# Patient Record
Sex: FEMALE | Race: WHITE | NOT HISPANIC OR LATINO | URBAN - METROPOLITAN AREA
[De-identification: names, ages, dates, MRNs, and addresses within clinical notes are randomized per-mention and may not be internally consistent; named-entity substitution may affect disease eponyms.]

---

## 2020-07-08 ENCOUNTER — OFFICE VISIT (OUTPATIENT)
Dept: URBAN - METROPOLITAN AREA CLINIC 113 | Facility: CLINIC | Age: 45
End: 2020-07-08

## 2020-07-25 ENCOUNTER — TELEPHONE ENCOUNTER (OUTPATIENT)
Dept: URBAN - METROPOLITAN AREA CLINIC 13 | Facility: CLINIC | Age: 45
End: 2020-07-25

## 2020-07-25 RX ORDER — CIPROFLOXACIN HYDROCHLORIDE 500 MG/1
TAKE 1 TABLET TWICE DAILY TABLET, FILM COATED ORAL
Qty: 28 | Refills: 0 | OUTPATIENT
Start: 2016-06-16 | End: 2018-09-26

## 2020-07-25 RX ORDER — HYDROCORTISONE ACETATE 25 MG
INSERT 1 SUPPOSITORY RECTALLY EVERY NIGHT AT BEDTIME AS NEEDED SUPPOSITORY, RECTAL RECTAL
Qty: 7 | Refills: 0 | OUTPATIENT
Start: 2015-07-26 | End: 2018-09-26

## 2020-07-25 RX ORDER — TRAMADOL HYDROCHLORIDE 50 MG/1
TAKE 1 TABLET EVERY 4 TO 6 HOURS AS NEEDED TABLET ORAL
Qty: 25 | Refills: 0 | OUTPATIENT
Start: 2015-07-27 | End: 2016-06-16

## 2020-07-25 RX ORDER — CHOLESTYRAMINE 4 G/9G
USE 1 SCOOP TWICE DAILY POWDER, FOR SUSPENSION ORAL
Qty: 378 | Refills: 10 | OUTPATIENT
Start: 2015-05-29 | End: 2016-06-16

## 2020-07-25 RX ORDER — PREDNISONE 10 MG/1
TAKE 6 TABLET DAILY DECREASE BY 5 MG EACH WEEK TIL GONE TABLET ORAL
Qty: 150 | Refills: 0 | OUTPATIENT
Start: 2015-07-28 | End: 2016-06-16

## 2020-07-25 RX ORDER — HYDROCORTISONE ACETATE 25 MG/1
INSERT 1 SUPPOSITORY RECTALLY AT BEDTIME AS NEEDED SUPPOSITORY RECTAL
Qty: 7 | Refills: 0 | OUTPATIENT
Start: 2015-05-29 | End: 2015-06-25

## 2020-07-25 RX ORDER — CALCIUM CARBONATE 1000 MG/1
TAKE 4 TABLET DAILY TABLET, CHEWABLE ORAL
Refills: 0 | OUTPATIENT
End: 2015-07-27

## 2020-07-25 RX ORDER — CIPROFLOXACIN HYDROCHLORIDE 500 MG/1
TAKE 1 TABLET TWICE DAILY TABLET, FILM COATED ORAL
Qty: 28 | Refills: 0 | OUTPATIENT
Start: 2015-07-27 | End: 2015-08-19

## 2020-07-25 RX ORDER — CHLORDIAZEPOXIDE HYDROCHLORIDE AND CLIDINIUM BROMIDE 5; 2.5 MG/1; MG/1
TAKE 1 CAPSULE EVERY 6 HOURS PRN CAPSULE ORAL
Qty: 50 | Refills: 1 | OUTPATIENT
Start: 2015-08-31 | End: 2016-06-16

## 2020-07-25 RX ORDER — PANTOPRAZOLE SODIUM 20 MG
TAKE 1 TABLET DAILY(PT STATES DOSAGE UNKNOWN) TABLET, DELAYED RELEASE (ENTERIC COATED) ORAL
Refills: 0 | OUTPATIENT
End: 2015-06-25

## 2020-07-25 RX ORDER — METRONIDAZOLE 500 MG/1
TAKE 1 TABLET 3 TIMES DAILY UNTIL GONE TABLET ORAL
Qty: 42 | Refills: 0 | OUTPATIENT
Start: 2016-06-16 | End: 2018-09-26

## 2020-07-25 RX ORDER — OXYCODONE HYDROCHLORIDE AND ACETAMINOPHEN 5; 325 MG/1; MG/1
TAKE 1 TO 2 TABLETS EVERY 4 HOURS AS NEEDED FOR PAIN TABLET ORAL
Qty: 30 | Refills: 0 | OUTPATIENT
End: 2015-06-25

## 2020-07-25 RX ORDER — METRONIDAZOLE 500 MG/1
TAKE 1 TABLET 3 TIMES DAILY TABLET ORAL
Qty: 42 | Refills: 0 | OUTPATIENT
Start: 2015-07-27 | End: 2015-08-19

## 2020-07-25 RX ORDER — GLUC/MSM/COLGN2/HYAL/ANTIARTH3 375-375-20
TAKE 1 TABLET DAILY AS DIRECTED TABLET ORAL
Refills: 0 | OUTPATIENT
End: 2015-07-27

## 2020-07-25 RX ORDER — POLYETHYLENE GLYCOL 3350, SODIUM SULFATE, SODIUM CHLORIDE, POTASSIUM CHLORIDE, ASCORBIC ACID, SODIUM ASCORBATE 7.5-2.691G
USE AS DIRECTED KIT ORAL
Qty: 1 | Refills: 0 | OUTPATIENT
Start: 2015-07-28 | End: 2015-08-11

## 2020-07-25 RX ORDER — HYDROCORTISONE ACETATE 25 MG/1
INSERT 1 SUPPOSITORY RECTALLY 2 TIMES DAILY SUPPOSITORY RECTAL
Qty: 14 | Refills: 0 | OUTPATIENT
Start: 2015-07-24 | End: 2015-07-27

## 2020-07-25 RX ORDER — FLUCONAZOLE 100 MG/1
TAKE 1 TABLET DAILY TABLET ORAL
Qty: 5 | Refills: 0 | OUTPATIENT
Start: 2015-07-31 | End: 2015-08-19

## 2020-07-25 RX ORDER — HYDROCORTISONE ACETATE 25 MG/1
INSERT 1 SUPPOSITORY RECTALLY AT BEDTIME AS NEEDED SUPPOSITORY RECTAL
Qty: 7 | Refills: 0 | OUTPATIENT
Start: 2016-06-13 | End: 2016-06-16

## 2020-07-25 RX ORDER — POLYETHYLENE GLYCOL 3350, SODIUM CHLORIDE, SODIUM BICARBONATE AND POTASSIUM CHLORIDE WITH LEMON FLAVOR 420; 11.2; 5.72; 1.48 G/4L; G/4L; G/4L; G/4L
TAKE 1/2 GALLON AT 5:00 PM DAY BEFORE PROCEDURE, TAKE SECOND 1/2 OF GALLON 6 HRS PRIOR TO PROCEDURE POWDER, FOR SOLUTION ORAL
Qty: 1 | Refills: 0 | OUTPATIENT
Start: 2018-12-05 | End: 2018-12-10

## 2020-07-25 RX ORDER — CERTOLIZUMAB PEGOL 400 MG
INJECT MG  400 MG EVERY 2 WEEKS KIT SUBCUTANEOUS
Refills: 0 | OUTPATIENT
End: 2018-09-26

## 2020-07-25 RX ORDER — CONJUGATED ESTROGENS 0.62 MG/G
INSERT 1 APPLICATOR DAILY CREAM VAGINAL
Refills: 0 | OUTPATIENT
End: 2018-09-26

## 2020-07-25 RX ORDER — PANTOPRAZOLE SODIUM 40 MG/1
TAKE ONE TABLET BY MOUTH TWICE A DAY TABLET, DELAYED RELEASE ORAL
Qty: 60 | Refills: 0 | OUTPATIENT
Start: 2015-09-29 | End: 2016-06-16

## 2020-07-26 ENCOUNTER — TELEPHONE ENCOUNTER (OUTPATIENT)
Dept: URBAN - METROPOLITAN AREA CLINIC 13 | Facility: CLINIC | Age: 45
End: 2020-07-26

## 2020-07-26 RX ORDER — USTEKINUMAB 90 MG/ML
MAINTENANCE: INJECT 1 SYRINGE SUBCUTANEOUSLY EVERY 4 WEEKS. REFRIGERATE. DO NOT FREEZE INJECTION, SOLUTION SUBCUTANEOUS
Qty: 3 | Refills: 3 | Status: ACTIVE | COMMUNITY
Start: 2020-07-21

## 2020-07-26 RX ORDER — LORAZEPAM 0.5 MG/1
TAKE 1 TABLET EVERY 4-6 HOURS PRN FOR RESTLESSNESS TABLET ORAL
Qty: 30 | Refills: 0 | Status: ACTIVE | COMMUNITY

## 2020-07-26 RX ORDER — METHOTREXATE 25 MG/ML
INJECT 25 MG(1ML) SUBCUTANEOUS ONCE A WEEK INJECTION, SOLUTION INTRA-ARTERIAL; INTRAMUSCULAR; INTRAVENOUS
Qty: 12 | Refills: 3 | Status: ACTIVE | COMMUNITY
Start: 2016-07-01

## 2020-07-26 RX ORDER — AMLODIPINE BESYLATE 2.5 MG/1
TAKE 1 TABLET DAILY TABLET ORAL
Refills: 0 | Status: ACTIVE | COMMUNITY
Start: 2019-08-07

## 2020-07-26 RX ORDER — AZITHROMYCIN DIHYDRATE 500 MG/1
TABLET, FILM COATED ORAL
Qty: 5 | Refills: 0 | Status: ACTIVE | COMMUNITY
Start: 2018-03-19

## 2020-07-26 RX ORDER — AMOXICILLIN AND CLAVULANATE POTASSIUM 875; 125 MG/1; MG/1
TABLET, FILM COATED ORAL
Qty: 14 | Refills: 0 | Status: ACTIVE | COMMUNITY
Start: 2019-08-08

## 2020-07-26 RX ORDER — FLUCONAZOLE 150 MG/1
TABLET ORAL
Qty: 1 | Refills: 0 | Status: ACTIVE | COMMUNITY
Start: 2018-11-12

## 2020-07-26 RX ORDER — ONDANSETRON 8 MG/1
TABLET ORAL
Qty: 18 | Refills: 0 | Status: ACTIVE | COMMUNITY
Start: 2018-03-10

## 2020-07-26 RX ORDER — BUPROPION HYDROCHLORIDE 300 MG/1
TK 1 T PO  QAM TABLET, EXTENDED RELEASE ORAL
Qty: 90 | Refills: 0 | Status: ACTIVE | COMMUNITY
Start: 2018-10-01

## 2020-07-26 RX ORDER — FOLIC ACID 1 MG/1
TAKE 1 TABLET BY MOUTH DAILY AS DIRECTED TABLET ORAL
Qty: 90 | Refills: 3 | Status: ACTIVE | COMMUNITY
Start: 2019-04-10

## 2020-07-26 RX ORDER — CHOLESTYRAMINE 4 G/5.5G
MIX THE CONTENTS OF 1 POWDER PACKET WITH 2-6 OZ OF NONCARBONATED BEVERAGE AND SWALLOW ONCE DAILY IN THE EVENING POWDER, FOR SUSPENSION ORAL
Qty: 60 | Refills: 5 | Status: ACTIVE | COMMUNITY
Start: 2019-11-07

## 2020-07-26 RX ORDER — NYSTATIN 100000 [USP'U]/ML
SUSPENSION ORAL
Qty: 140 | Refills: 0 | Status: ACTIVE | COMMUNITY
Start: 2018-03-19

## 2020-07-26 RX ORDER — PHENTERMINE HYDROCHLORIDE 30 MG/1
TK 1 T PO  QD TABLET ORAL
Qty: 30 | Refills: 0 | Status: ACTIVE | COMMUNITY
Start: 2019-03-07

## 2020-07-26 RX ORDER — HYDROCODONE BITARTRATE AND ACETAMINOPHEN 5; 325 MG/1; MG/1
TABLET ORAL
Qty: 20 | Refills: 0 | Status: ACTIVE | COMMUNITY
Start: 2019-03-19

## 2020-07-26 RX ORDER — ALENDRONATE SODIUM 70 MG/1
TABLET ORAL
Qty: 4 | Refills: 0 | Status: ACTIVE | COMMUNITY
Start: 2018-03-05

## 2020-07-26 RX ORDER — FLUCONAZOLE 150 MG/1
TABLET ORAL
Qty: 1 | Refills: 0 | Status: ACTIVE | COMMUNITY
Start: 2018-03-19

## 2020-07-26 RX ORDER — AMOXICILLIN AND CLAVULANATE POTASSIUM 875; 125 MG/1; MG/1
TABLET, FILM COATED ORAL
Qty: 14 | Refills: 0 | Status: ACTIVE | COMMUNITY
Start: 2019-02-19

## 2020-07-26 RX ORDER — ONDANSETRON 8 MG/1
TK 1 T PO Q 8 H PRF NAUSEA TABLET ORAL
Qty: 18 | Refills: 0 | Status: ACTIVE | COMMUNITY
Start: 2018-03-05

## 2020-07-26 RX ORDER — OSELTAMIVIR PHOSPHATE 75 MG/1
CAPSULE ORAL
Qty: 10 | Refills: 0 | Status: ACTIVE | COMMUNITY
Start: 2019-01-31

## 2020-07-26 RX ORDER — ONDANSETRON HYDROCHLORIDE 4 MG/1
TABLET, FILM COATED ORAL
Qty: 9 | Refills: 0 | Status: ACTIVE | COMMUNITY
Start: 2018-01-09

## 2020-07-26 RX ORDER — ONDANSETRON HYDROCHLORIDE 4 MG/1
TK 1 T PO Q 8 H PRF NAUSEA TABLET, FILM COATED ORAL
Qty: 9 | Refills: 0 | Status: ACTIVE | COMMUNITY
Start: 2017-08-09

## 2020-07-26 RX ORDER — AMOXICILLIN AND CLAVULANATE POTASSIUM 875; 125 MG/1; MG/1
TABLET, FILM COATED ORAL
Qty: 14 | Refills: 0 | Status: ACTIVE | COMMUNITY
Start: 2018-11-01

## 2020-07-26 RX ORDER — ESTROGENS, CONJUGATED 0.62 MG/1
TAKE 1 TABLET DAILY TABLET, FILM COATED ORAL
Refills: 0 | Status: ACTIVE | COMMUNITY

## 2020-07-26 RX ORDER — NYSTATIN 100000 [USP'U]/ML
SWISH AND SWALLOW THREE TIMES DAILY AS NEEDED FOR THRUSH SUSPENSION ORAL
Qty: 1800 | Refills: 0 | Status: ACTIVE | COMMUNITY
Start: 2019-01-18

## 2020-07-26 RX ORDER — ONDANSETRON 8 MG/1
TABLET ORAL
Qty: 18 | Refills: 0 | Status: ACTIVE | COMMUNITY
Start: 2019-01-14

## 2020-07-26 RX ORDER — BUDESONIDE 28 MG/1
APPLY RECTALLY TWICE DAILY AEROSOL, FOAM RECTAL
Qty: 2 | Refills: 2 | Status: ACTIVE | COMMUNITY
Start: 2019-11-07

## 2020-07-26 RX ORDER — BENAZEPRIL HYDROCHLORIDE 10 MG/1
TABLET, COATED ORAL
Qty: 30 | Refills: 0 | Status: ACTIVE | COMMUNITY
Start: 2019-02-19

## 2021-02-24 ENCOUNTER — TELEPHONE ENCOUNTER (OUTPATIENT)
Dept: URBAN - METROPOLITAN AREA CLINIC 113 | Facility: CLINIC | Age: 46
End: 2021-02-24

## 2021-07-23 ENCOUNTER — ERX REFILL RESPONSE (OUTPATIENT)
Dept: URBAN - METROPOLITAN AREA CLINIC 113 | Facility: CLINIC | Age: 46
End: 2021-07-23

## 2021-07-23 ENCOUNTER — TELEPHONE ENCOUNTER (OUTPATIENT)
Dept: URBAN - METROPOLITAN AREA CLINIC 113 | Facility: CLINIC | Age: 46
End: 2021-07-23

## 2021-07-23 RX ORDER — USTEKINUMAB 90 MG/ML
MAINTENANCE: INJECT 1 SYRINGE SUBCUTANEOUSLY EVERY 4 WEEKS. REFRIGERATE. DO NOT FREEZE INJECTION, SOLUTION SUBCUTANEOUS
Qty: 3 | Refills: 4
Start: 2020-07-21 | End: 2022-10-16

## 2021-07-23 RX ORDER — USTEKINUMAB 90 MG/ML
MAINTENANCE: INJECT 1 SYRINGE SUBCUTANEOUSLY EVERY 4 WEEKS. REFRIGERATE. DO NOT FREEZE INJECTION, SOLUTION SUBCUTANEOUS
Qty: 1 | Refills: 3 | OUTPATIENT

## 2021-07-23 RX ORDER — NYSTATIN 100000 [USP'U]/ML
4 ML SUSPENSION ORAL
Qty: 1080 ML | Refills: 4
Start: 2018-03-19 | End: 2022-10-16

## 2021-07-23 RX ORDER — USTEKINUMAB 90 MG/ML
MAINTENANCE: INJECT 1 SYRINGE SUBCUTANEOUSLY EVERY 4 WEEKS. REFRIGERATE. DO NOT FREEZE INJECTION, SOLUTION SUBCUTANEOUS
Qty: 3 | Refills: 4 | OUTPATIENT

## 2021-08-02 ENCOUNTER — TELEPHONE ENCOUNTER (OUTPATIENT)
Dept: URBAN - METROPOLITAN AREA CLINIC 113 | Facility: CLINIC | Age: 46
End: 2021-08-02

## 2021-08-02 RX ORDER — USTEKINUMAB 90 MG/ML
90 MG/ML INJECTION, SOLUTION SUBCUTANEOUS
Qty: 3 | Refills: 6

## 2022-08-29 ENCOUNTER — TELEPHONE ENCOUNTER (OUTPATIENT)
Dept: URBAN - METROPOLITAN AREA CLINIC 113 | Facility: CLINIC | Age: 47
End: 2022-08-29

## 2022-08-29 RX ORDER — USTEKINUMAB 90 MG/ML
90 MG/ML INJECTION, SOLUTION SUBCUTANEOUS
Qty: 3 | Refills: 6
End: 2024-05-20

## 2022-08-29 RX ORDER — ONDANSETRON HYDROCHLORIDE 4 MG/1
1 TABLET TABLET, FILM COATED ORAL EVERY 4 HOURS
Qty: 30 TABLET | Refills: 1
Start: 2017-08-09

## 2022-09-08 ENCOUNTER — TELEPHONE ENCOUNTER (OUTPATIENT)
Dept: URBAN - METROPOLITAN AREA CLINIC 113 | Facility: CLINIC | Age: 47
End: 2022-09-08

## 2022-11-09 ENCOUNTER — OFFICE VISIT (OUTPATIENT)
Dept: URBAN - METROPOLITAN AREA CLINIC 113 | Facility: CLINIC | Age: 47
End: 2022-11-09

## 2023-01-05 ENCOUNTER — WEB ENCOUNTER (OUTPATIENT)
Dept: URBAN - METROPOLITAN AREA CLINIC 113 | Facility: CLINIC | Age: 48
End: 2023-01-05

## 2023-01-05 ENCOUNTER — LAB OUTSIDE AN ENCOUNTER (OUTPATIENT)
Dept: URBAN - METROPOLITAN AREA CLINIC 113 | Facility: CLINIC | Age: 48
End: 2023-01-05

## 2023-01-05 ENCOUNTER — DASHBOARD ENCOUNTERS (OUTPATIENT)
Age: 48
End: 2023-01-05

## 2023-01-05 ENCOUNTER — OFFICE VISIT (OUTPATIENT)
Dept: URBAN - METROPOLITAN AREA CLINIC 113 | Facility: CLINIC | Age: 48
End: 2023-01-05
Payer: COMMERCIAL

## 2023-01-05 VITALS
TEMPERATURE: 97.5 F | DIASTOLIC BLOOD PRESSURE: 85 MMHG | RESPIRATION RATE: 18 BRPM | HEART RATE: 80 BPM | SYSTOLIC BLOOD PRESSURE: 128 MMHG | BODY MASS INDEX: 24.24 KG/M2 | HEIGHT: 64 IN | WEIGHT: 142 LBS

## 2023-01-05 DIAGNOSIS — K50.80 CROHN'S DISEASE OF BOTH SMALL AND LARGE INTESTINE WITHOUT COMPLICATION: ICD-10-CM

## 2023-01-05 PROBLEM — 71833008: Status: ACTIVE | Noted: 2021-03-01

## 2023-01-05 PROCEDURE — 99214 OFFICE O/P EST MOD 30 MIN: CPT | Performed by: INTERNAL MEDICINE

## 2023-01-05 RX ORDER — CHOLESTYRAMINE 4 G/5.5G
MIX THE CONTENTS OF 1 POWDER PACKET WITH 2-6 OZ OF NONCARBONATED BEVERAGE AND SWALLOW ONCE DAILY IN THE EVENING POWDER, FOR SUSPENSION ORAL
Qty: 60 | Refills: 5 | Status: ON HOLD | COMMUNITY
Start: 2019-11-07

## 2023-01-05 RX ORDER — AZITHROMYCIN DIHYDRATE 500 MG/1
TABLET, FILM COATED ORAL
Qty: 5 | Refills: 0 | Status: ON HOLD | COMMUNITY
Start: 2018-03-19

## 2023-01-05 RX ORDER — CARVEDILOL 6.25 MG/1
1 TABLET WITH FOOD TABLET, FILM COATED ORAL TWICE A DAY
Status: ACTIVE | COMMUNITY

## 2023-01-05 RX ORDER — OSELTAMIVIR PHOSPHATE 75 MG/1
CAPSULE ORAL
Qty: 10 | Refills: 0 | Status: ON HOLD | COMMUNITY
Start: 2019-01-31

## 2023-01-05 RX ORDER — BENAZEPRIL HYDROCHLORIDE 10 MG/1
TABLET, COATED ORAL
Qty: 30 | Refills: 0 | Status: ON HOLD | COMMUNITY
Start: 2019-02-19

## 2023-01-05 RX ORDER — USTEKINUMAB 90 MG/ML
90 MG/ML INJECTION, SOLUTION SUBCUTANEOUS
Qty: 3 | Refills: 6 | Status: ACTIVE | COMMUNITY
End: 2024-05-20

## 2023-01-05 RX ORDER — AMLODIPINE BESYLATE 2.5 MG/1
TAKE 1 TABLET DAILY TABLET ORAL
Refills: 0 | Status: ACTIVE | COMMUNITY
Start: 2019-08-07

## 2023-01-05 RX ORDER — FLUCONAZOLE 150 MG/1
TABLET ORAL
Qty: 1 | Refills: 0 | Status: ON HOLD | COMMUNITY
Start: 2018-03-19

## 2023-01-05 RX ORDER — ALENDRONATE SODIUM 70 MG/1
TABLET ORAL
Qty: 4 | Refills: 0 | Status: ON HOLD | COMMUNITY
Start: 2018-03-05

## 2023-01-05 RX ORDER — FOLIC ACID 1 MG/1
TAKE 1 TABLET BY MOUTH DAILY AS DIRECTED TABLET ORAL
Qty: 90 | Refills: 3 | Status: ON HOLD | COMMUNITY
Start: 2019-04-10

## 2023-01-05 RX ORDER — ONDANSETRON HYDROCHLORIDE 4 MG/1
1 TABLET TABLET, FILM COATED ORAL EVERY 4 HOURS
Qty: 30 TABLET | Refills: 1 | Status: ACTIVE | COMMUNITY
Start: 2017-08-09

## 2023-01-05 RX ORDER — ONDANSETRON 8 MG/1
TK 1 T PO Q 8 H PRF NAUSEA TABLET ORAL
Qty: 18 | Refills: 0 | Status: ON HOLD | COMMUNITY
Start: 2018-03-05

## 2023-01-05 RX ORDER — HYDROCODONE BITARTRATE AND ACETAMINOPHEN 5; 325 MG/1; MG/1
TABLET ORAL
Qty: 20 | Refills: 0 | Status: ON HOLD | COMMUNITY
Start: 2019-03-19

## 2023-01-05 RX ORDER — PHENTERMINE HYDROCHLORIDE 30 MG/1
TK 1 T PO  QD TABLET ORAL
Qty: 30 | Refills: 0 | Status: ON HOLD | COMMUNITY
Start: 2019-03-07

## 2023-01-05 RX ORDER — LORAZEPAM 0.5 MG/1
TAKE 1 TABLET EVERY 4-6 HOURS PRN FOR RESTLESSNESS TABLET ORAL
Qty: 30 | Refills: 0 | Status: ON HOLD | COMMUNITY

## 2023-01-05 RX ORDER — BUPROPION HYDROCHLORIDE 300 MG/1
TK 1 T PO  QAM TABLET, EXTENDED RELEASE ORAL
Qty: 90 | Refills: 0 | Status: ON HOLD | COMMUNITY
Start: 2018-10-01

## 2023-01-05 RX ORDER — AMOXICILLIN AND CLAVULANATE POTASSIUM 875; 125 MG/1; MG/1
TABLET, FILM COATED ORAL
Qty: 14 | Refills: 0 | Status: ON HOLD | COMMUNITY
Start: 2018-11-01

## 2023-01-05 RX ORDER — ESTROGENS, CONJUGATED 0.62 MG/1
TAKE 1 TABLET DAILY TABLET, FILM COATED ORAL
Refills: 0 | Status: ON HOLD | COMMUNITY

## 2023-01-05 RX ORDER — BUDESONIDE 28 MG/1
APPLY RECTALLY TWICE DAILY AEROSOL, FOAM RECTAL
Qty: 2 | Refills: 2 | Status: ON HOLD | COMMUNITY
Start: 2019-11-07

## 2023-01-05 RX ORDER — METHOTREXATE 25 MG/ML
INJECT 25 MG(1ML) SUBCUTANEOUS ONCE A WEEK INJECTION, SOLUTION INTRA-ARTERIAL; INTRAMUSCULAR; INTRAVENOUS
Qty: 12 | Refills: 3 | Status: ON HOLD | COMMUNITY
Start: 2016-07-01

## 2023-01-05 NOTE — HPI-TODAY'S VISIT:
The patient is a very pleasant 47-year-old female who returns after long interval absence.  Initially started seeing the patient in 2015 for Crohn's disease as a second opinion.  She was coman[facility] with [facility] due to the complicated nature of her Crohn's disease.  Last colonoscopy by me was in December 2018 for disease activity assessment.  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  There was moderate inflammation of the distal ileum consistent with her Crohn's disease.  We last saw the patient 2-1/2 years ago. Laboratory testing from September revealed a negative QuantiFERON gold negative hepatitis B. The patient states that she is currently on Stelara.  She stated that Remicade stopped working.  She was switched to Entyvio but this caused profound side effects so she could not take it.  She then was placed on Humira but developed an ectopic pregnancy and other issues and complications related to Humira and was eventually switched to Stelara and she states she has had an excellent response with this.  She does take Questran as needed for diarrhea which she states works quite well.  She states she is felt the best she has felt in years on the Stelara.  She was on methotrexate but when COVID started she discontinued the methotrexate.

## 2023-01-06 LAB
A/G RATIO: 1.9
ABSOLUTE BASOPHILS: 54
ABSOLUTE EOSINOPHILS: 54
ABSOLUTE LYMPHOCYTES: 2322
ABSOLUTE MONOCYTES: 713
ABSOLUTE NEUTROPHILS: 7657
ALBUMIN: 4.3
ALKALINE PHOSPHATASE: 75
ALT (SGPT): 11
AST (SGOT): 12
BASOPHILS: 0.5
BILIRUBIN, TOTAL: 0.7
BUN/CREATININE RATIO: (no result)
BUN: 13
C-REACTIVE PROTEIN, QUANT: 5.9
CALCIUM: 9.6
CARBON DIOXIDE, TOTAL: 25
CHLORIDE: 109
CREATININE: 0.76
EGFR: 96
EOSINOPHILS: 0.5
GLOBULIN, TOTAL: 2.3
GLUCOSE: 87
HEMATOCRIT: 43.1
HEMOGLOBIN: 14.5
LYMPHOCYTES: 21.5
MCH: 30.1
MCHC: 33.6
MCV: 89.6
MONOCYTES: 6.6
MPV: 8.9
NEUTROPHILS: 70.9
PLATELET COUNT: 336
POTASSIUM: 4.8
PROTEIN, TOTAL: 6.6
RDW: 12.9
RED BLOOD CELL COUNT: 4.81
SODIUM: 140
WHITE BLOOD CELL COUNT: 10.8

## 2023-09-19 ENCOUNTER — ERX REFILL RESPONSE (OUTPATIENT)
Dept: URBAN - METROPOLITAN AREA CLINIC 113 | Facility: CLINIC | Age: 48
End: 2023-09-19

## 2023-09-19 RX ORDER — USTEKINUMAB 90 MG/ML
90 MG/ML INJECTION, SOLUTION SUBCUTANEOUS
Qty: 3 | Refills: 6 | OUTPATIENT

## 2023-10-06 ENCOUNTER — TELEPHONE ENCOUNTER (OUTPATIENT)
Dept: URBAN - METROPOLITAN AREA CLINIC 113 | Facility: CLINIC | Age: 48
End: 2023-10-06

## 2023-10-06 RX ORDER — USTEKINUMAB 90 MG/ML
90 MG/ML INJECTION, SOLUTION SUBCUTANEOUS
Qty: 3 | Refills: 6
End: 2025-06-27

## 2023-10-10 ENCOUNTER — TELEPHONE ENCOUNTER (OUTPATIENT)
Dept: URBAN - METROPOLITAN AREA CLINIC 113 | Facility: CLINIC | Age: 48
End: 2023-10-10

## 2023-10-12 ENCOUNTER — TELEPHONE ENCOUNTER (OUTPATIENT)
Dept: URBAN - METROPOLITAN AREA CLINIC 113 | Facility: CLINIC | Age: 48
End: 2023-10-12

## 2023-10-19 ENCOUNTER — TELEPHONE ENCOUNTER (OUTPATIENT)
Dept: URBAN - METROPOLITAN AREA CLINIC 113 | Facility: CLINIC | Age: 48
End: 2023-10-19

## 2023-11-03 ENCOUNTER — TELEPHONE ENCOUNTER (OUTPATIENT)
Dept: URBAN - METROPOLITAN AREA CLINIC 113 | Facility: CLINIC | Age: 48
End: 2023-11-03

## 2023-11-06 ENCOUNTER — TELEPHONE ENCOUNTER (OUTPATIENT)
Dept: URBAN - METROPOLITAN AREA CLINIC 113 | Facility: CLINIC | Age: 48
End: 2023-11-06

## 2023-11-06 RX ORDER — USTEKINUMAB 90 MG/ML
90 MG/ML INJECTION, SOLUTION SUBCUTANEOUS
Qty: 3 | Refills: 6
End: 2025-07-29

## 2024-10-10 ENCOUNTER — TELEPHONE ENCOUNTER (OUTPATIENT)
Dept: URBAN - METROPOLITAN AREA CLINIC 113 | Facility: CLINIC | Age: 49
End: 2024-10-10

## 2024-10-10 RX ORDER — USTEKINUMAB 90 MG/ML
90 MG/ML INJECTION, SOLUTION SUBCUTANEOUS
Qty: 3 | Refills: 6
End: 2026-07-02

## 2024-10-11 ENCOUNTER — TELEPHONE ENCOUNTER (OUTPATIENT)
Dept: URBAN - METROPOLITAN AREA CLINIC 113 | Facility: CLINIC | Age: 49
End: 2024-10-11

## 2024-11-18 ENCOUNTER — OFFICE VISIT (OUTPATIENT)
Dept: URBAN - METROPOLITAN AREA CLINIC 113 | Facility: CLINIC | Age: 49
End: 2024-11-18
Payer: COMMERCIAL

## 2024-11-18 ENCOUNTER — LAB OUTSIDE AN ENCOUNTER (OUTPATIENT)
Dept: URBAN - METROPOLITAN AREA CLINIC 113 | Facility: CLINIC | Age: 49
End: 2024-11-18

## 2024-11-18 ENCOUNTER — TELEPHONE ENCOUNTER (OUTPATIENT)
Dept: URBAN - METROPOLITAN AREA CLINIC 113 | Facility: CLINIC | Age: 49
End: 2024-11-18

## 2024-11-18 VITALS
RESPIRATION RATE: 18 BRPM | DIASTOLIC BLOOD PRESSURE: 95 MMHG | HEART RATE: 84 BPM | TEMPERATURE: 98.1 F | SYSTOLIC BLOOD PRESSURE: 132 MMHG | WEIGHT: 143.6 LBS | HEIGHT: 64 IN | BODY MASS INDEX: 24.52 KG/M2

## 2024-11-18 DIAGNOSIS — K50.80 CROHN'S DISEASE OF BOTH SMALL AND LARGE INTESTINE WITHOUT COMPLICATION: ICD-10-CM

## 2024-11-18 PROCEDURE — 99214 OFFICE O/P EST MOD 30 MIN: CPT | Performed by: INTERNAL MEDICINE

## 2024-11-18 RX ORDER — USTEKINUMAB 90 MG/ML
90 MG/ML INJECTION, SOLUTION SUBCUTANEOUS
Qty: 3 | Refills: 6 | Status: ACTIVE | COMMUNITY
End: 2026-07-02

## 2024-11-18 RX ORDER — FLUCONAZOLE 150 MG/1
TABLET ORAL
Qty: 1 | Refills: 0 | Status: ON HOLD | COMMUNITY
Start: 2018-03-19

## 2024-11-18 RX ORDER — PHENTERMINE HYDROCHLORIDE 30 MG/1
TK 1 T PO  QD TABLET ORAL
Qty: 30 | Refills: 0 | Status: ON HOLD | COMMUNITY
Start: 2019-03-07

## 2024-11-18 RX ORDER — HYDROCODONE BITARTRATE AND ACETAMINOPHEN 5; 325 MG/1; MG/1
TABLET ORAL
Qty: 20 | Refills: 0 | Status: ON HOLD | COMMUNITY
Start: 2019-03-19

## 2024-11-18 RX ORDER — ONDANSETRON HYDROCHLORIDE 4 MG/1
1 TABLET TABLET, FILM COATED ORAL EVERY 4 HOURS
Qty: 30 TABLET | Refills: 1
Start: 2017-08-09

## 2024-11-18 RX ORDER — AMOXICILLIN AND CLAVULANATE POTASSIUM 875; 125 MG/1; MG/1
TABLET, FILM COATED ORAL
Qty: 14 | Refills: 0 | Status: ON HOLD | COMMUNITY
Start: 2018-11-01

## 2024-11-18 RX ORDER — OSELTAMIVIR PHOSPHATE 75 MG/1
CAPSULE ORAL
Qty: 10 | Refills: 0 | Status: ON HOLD | COMMUNITY
Start: 2019-01-31

## 2024-11-18 RX ORDER — BUPROPION HYDROCHLORIDE 300 MG/1
TK 1 T PO  QAM TABLET, EXTENDED RELEASE ORAL
Qty: 90 | Refills: 0 | Status: ON HOLD | COMMUNITY
Start: 2018-10-01

## 2024-11-18 RX ORDER — METHOTREXATE 25 MG/ML
INJECT 25 MG(1ML) SUBCUTANEOUS ONCE A WEEK INJECTION, SOLUTION INTRA-ARTERIAL; INTRAMUSCULAR; INTRAVENOUS
Qty: 12 | Refills: 3 | Status: ON HOLD | COMMUNITY
Start: 2016-07-01

## 2024-11-18 RX ORDER — AZITHROMYCIN DIHYDRATE 500 MG/1
TABLET, FILM COATED ORAL
Qty: 5 | Refills: 0 | Status: ON HOLD | COMMUNITY
Start: 2018-03-19

## 2024-11-18 RX ORDER — CARVEDILOL 6.25 MG/1
1 TABLET WITH FOOD TABLET, FILM COATED ORAL TWICE A DAY
Status: ON HOLD | COMMUNITY

## 2024-11-18 RX ORDER — ALENDRONATE SODIUM 70 MG/1
TABLET ORAL
Qty: 4 | Refills: 0 | Status: ON HOLD | COMMUNITY
Start: 2018-03-05

## 2024-11-18 RX ORDER — BUDESONIDE 28 MG/1
APPLY RECTALLY TWICE DAILY AEROSOL, FOAM RECTAL
Qty: 2 | Refills: 2 | Status: ON HOLD | COMMUNITY
Start: 2019-11-07

## 2024-11-18 RX ORDER — ESTROGENS, CONJUGATED 0.62 MG/1
TAKE 1 TABLET DAILY TABLET, FILM COATED ORAL
Refills: 0 | Status: ON HOLD | COMMUNITY

## 2024-11-18 RX ORDER — ONDANSETRON HYDROCHLORIDE 4 MG/1
1 TABLET TABLET, FILM COATED ORAL EVERY 4 HOURS
Qty: 30 TABLET | Refills: 1 | Status: ACTIVE | COMMUNITY
Start: 2017-08-09

## 2024-11-18 RX ORDER — LORAZEPAM 0.5 MG/1
TAKE 1 TABLET EVERY 4-6 HOURS PRN FOR RESTLESSNESS TABLET ORAL
Qty: 30 | Refills: 0

## 2024-11-18 RX ORDER — AMLODIPINE BESYLATE 2.5 MG/1
TAKE 1 TABLET DAILY TABLET ORAL
Refills: 0 | Status: ACTIVE | COMMUNITY
Start: 2019-08-07

## 2024-11-18 RX ORDER — CHOLESTYRAMINE 4 G/5.5G
MIX THE CONTENTS OF 1 POWDER PACKET WITH 2-6 OZ OF NONCARBONATED BEVERAGE AND SWALLOW ONCE DAILY IN THE EVENING POWDER, FOR SUSPENSION ORAL
Qty: 60 | Refills: 5 | Status: ON HOLD | COMMUNITY
Start: 2019-11-07

## 2024-11-18 RX ORDER — ONDANSETRON 8 MG/1
TK 1 T PO Q 8 H PRF NAUSEA TABLET ORAL
Qty: 18 | Refills: 0 | Status: ON HOLD | COMMUNITY
Start: 2018-03-05

## 2024-11-18 RX ORDER — BENAZEPRIL HYDROCHLORIDE 10 MG/1
TABLET, COATED ORAL
Qty: 30 | Refills: 0 | Status: ON HOLD | COMMUNITY
Start: 2019-02-19

## 2024-11-18 RX ORDER — FOLIC ACID 1 MG/1
TAKE 1 TABLET BY MOUTH DAILY AS DIRECTED TABLET ORAL
Qty: 90 | Refills: 3 | Status: ON HOLD | COMMUNITY
Start: 2019-04-10

## 2024-11-18 RX ORDER — LORAZEPAM 0.5 MG/1
TAKE 1 TABLET EVERY 4-6 HOURS PRN FOR RESTLESSNESS TABLET ORAL
Qty: 30 | Refills: 0 | Status: ON HOLD | COMMUNITY

## 2024-11-18 NOTE — HPI-TODAY'S VISIT:
The patient is a very pleasant 47-year-old female who returns after long interval absence.  Initially started seeing the patient in 2015 for Crohn's disease as a second opinion.  She was comanaged with [facility] due to the complicated nature of her Crohn's disease.  Last colonoscopy by me was in December 2018 for disease activity assessment.  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  There was moderate inflammation of the distal ileum consistent with her Crohn's disease.  We last saw the patient 2-1/2 years ago. Laboratory testing from September revealed a negative QuantiFERON gold negative hepatitis B. The patient states that she is currently on Stelara.  She stated that Remicade stopped working.  She was switched to Entyvio but this caused profound side effects so she could not take it.  She then was placed on Humira but developed an ectopic pregnancy and other issues and complications related to Humira and was eventually switched to Stelara and she states she has had an excellent response with this.  She does take Questran as needed for diarrhea which she states works quite well.  She states she is felt the best she has felt in years on the Stelara.  She was on methotrexate but when COVID started she discontinued the methotrexate. Interval history, 11/18/2024. The patient states she has had no new past medical or past surgical history since I last saw her.  She states she is doing quite well.  She states Questran well was excellent on the loose stools caused too much bloating.  She is now taking a chlorophyll supplement which she states does a great job of keeping her bowel movements down to 2-3 bowel movements daily.  She would also like a refill of her Ativan and Zofran.  She rarely utilizes these.

## 2024-11-19 ENCOUNTER — TELEPHONE ENCOUNTER (OUTPATIENT)
Dept: URBAN - METROPOLITAN AREA CLINIC 113 | Facility: CLINIC | Age: 49
End: 2024-11-19

## 2024-11-19 RX ORDER — USTEKINUMAB 90 MG/ML
90 MG/ML INJECTION, SOLUTION SUBCUTANEOUS
Qty: 3 | Refills: 6
End: 2026-08-11

## 2024-11-21 LAB
A/G RATIO: 1.9
ABSOLUTE BASOPHILS: 41
ABSOLUTE EOSINOPHILS: 16
ABSOLUTE LYMPHOCYTES: 2066
ABSOLUTE MONOCYTES: 397
ABSOLUTE NEUTROPHILS: 5581
ALBUMIN: 4.1
ALKALINE PHOSPHATASE: 78
ALT (SGPT): 9
AST (SGOT): 13
BASOPHILS: 0.5
BILIRUBIN, TOTAL: 0.4
BUN/CREATININE RATIO: (no result)
BUN: 12
C-REACTIVE PROTEIN, QUANT: <3
CALCIUM: 9.1
CARBON DIOXIDE, TOTAL: 25
CHLORIDE: 106
CREATININE: 0.8
EGFR: 89
EOSINOPHILS: 0.2
GLOBULIN, TOTAL: 2.2
GLUCOSE: 90
HEMATOCRIT: 46.3
HEMOGLOBIN: 14.8
LYMPHOCYTES: 25.5
MCH: 31.2
MCHC: 32
MCV: 97.7
MITOGEN-NIL: >10
MONOCYTES: 4.9
MPV: 9
NEUTROPHILS: 68.9
PLATELET COUNT: 366
POTASSIUM: 4.1
PROTEIN, TOTAL: 6.3
QUANTIFERON NIL VALUE: 0.06
QUANTIFERON TB1 AG VALUE: 0.02
QUANTIFERON TB2 AG VALUE: 0.02
QUANTIFERON-TB GOLD PLUS: NEGATIVE
RDW: 15.2
RED BLOOD CELL COUNT: 4.74
SODIUM: 142
WHITE BLOOD CELL COUNT: 8.1

## 2025-02-17 ENCOUNTER — TELEPHONE ENCOUNTER (OUTPATIENT)
Dept: URBAN - METROPOLITAN AREA CLINIC 113 | Facility: CLINIC | Age: 50
End: 2025-02-17

## 2025-02-17 ENCOUNTER — OFFICE VISIT (OUTPATIENT)
Dept: URBAN - METROPOLITAN AREA SURGERY CENTER 25 | Facility: SURGERY CENTER | Age: 50
End: 2025-02-17

## 2025-02-17 RX ORDER — ONDANSETRON HYDROCHLORIDE 4 MG/1
1 TABLET TABLET, FILM COATED ORAL EVERY 4 HOURS
Qty: 30 TABLET | Refills: 1 | Status: ACTIVE | COMMUNITY
Start: 2017-08-09

## 2025-02-17 RX ORDER — CARVEDILOL 6.25 MG/1
1 TABLET WITH FOOD TABLET, FILM COATED ORAL TWICE A DAY
Status: ON HOLD | COMMUNITY

## 2025-02-17 RX ORDER — PHENTERMINE HYDROCHLORIDE 30 MG/1
TK 1 T PO  QD TABLET ORAL
Qty: 30 | Refills: 0 | Status: ON HOLD | COMMUNITY
Start: 2019-03-07

## 2025-02-17 RX ORDER — ESTROGENS, CONJUGATED 0.62 MG/1
TAKE 1 TABLET DAILY TABLET, FILM COATED ORAL
Refills: 0 | Status: ON HOLD | COMMUNITY

## 2025-02-17 RX ORDER — METHOTREXATE 25 MG/ML
INJECT 25 MG(1ML) SUBCUTANEOUS ONCE A WEEK INJECTION, SOLUTION INTRA-ARTERIAL; INTRAMUSCULAR; INTRAVENOUS
Qty: 12 | Refills: 3 | Status: ON HOLD | COMMUNITY
Start: 2016-07-01

## 2025-02-17 RX ORDER — BENAZEPRIL HYDROCHLORIDE 10 MG/1
TABLET, COATED ORAL
Qty: 30 | Refills: 0 | Status: ON HOLD | COMMUNITY
Start: 2019-02-19

## 2025-02-17 RX ORDER — OSELTAMIVIR PHOSPHATE 75 MG/1
CAPSULE ORAL
Qty: 10 | Refills: 0 | Status: ON HOLD | COMMUNITY
Start: 2019-01-31

## 2025-02-17 RX ORDER — FOLIC ACID 1 MG/1
TAKE 1 TABLET BY MOUTH DAILY AS DIRECTED TABLET ORAL
Qty: 90 | Refills: 3 | Status: ON HOLD | COMMUNITY
Start: 2019-04-10

## 2025-02-17 RX ORDER — ALENDRONATE SODIUM 70 MG/1
TABLET ORAL
Qty: 4 | Refills: 0 | Status: ON HOLD | COMMUNITY
Start: 2018-03-05

## 2025-02-17 RX ORDER — USTEKINUMAB 90 MG/ML
90 MG/ML INJECTION, SOLUTION SUBCUTANEOUS
Qty: 3 | Refills: 6 | Status: ACTIVE | COMMUNITY
End: 2026-08-11

## 2025-02-17 RX ORDER — LORAZEPAM 0.5 MG/1
TAKE 1 TABLET EVERY 4-6 HOURS PRN FOR RESTLESSNESS TABLET ORAL
Qty: 30 | Refills: 0 | Status: ACTIVE | COMMUNITY

## 2025-02-17 RX ORDER — AMLODIPINE BESYLATE 2.5 MG/1
TAKE 1 TABLET DAILY TABLET ORAL
Refills: 0 | Status: ACTIVE | COMMUNITY
Start: 2019-08-07

## 2025-02-17 RX ORDER — AZITHROMYCIN 500 MG/1
TABLET, FILM COATED ORAL
Qty: 5 | Refills: 0 | Status: ON HOLD | COMMUNITY
Start: 2018-03-19

## 2025-02-17 RX ORDER — BUPROPION HYDROCHLORIDE 300 MG/1
TK 1 T PO  QAM TABLET, EXTENDED RELEASE ORAL
Qty: 90 | Refills: 0 | Status: ON HOLD | COMMUNITY
Start: 2018-10-01

## 2025-02-17 RX ORDER — ONDANSETRON 8 MG/1
TK 1 T PO Q 8 H PRF NAUSEA TABLET ORAL
Qty: 18 | Refills: 0 | Status: ON HOLD | COMMUNITY
Start: 2018-03-05

## 2025-02-17 RX ORDER — BUDESONIDE 28 MG/1
APPLY RECTALLY TWICE DAILY AEROSOL, FOAM RECTAL
Qty: 2 | Refills: 2 | Status: ON HOLD | COMMUNITY
Start: 2019-11-07

## 2025-02-17 RX ORDER — FLUCONAZOLE 150 MG/1
TABLET ORAL
Qty: 1 | Refills: 0 | Status: ON HOLD | COMMUNITY
Start: 2018-03-19

## 2025-02-17 RX ORDER — CHOLESTYRAMINE 4 G/5.5G
MIX THE CONTENTS OF 1 POWDER PACKET WITH 2-6 OZ OF NONCARBONATED BEVERAGE AND SWALLOW ONCE DAILY IN THE EVENING POWDER, FOR SUSPENSION ORAL
Qty: 60 | Refills: 5 | Status: ON HOLD | COMMUNITY
Start: 2019-11-07

## 2025-02-17 RX ORDER — HYDROCODONE BITARTRATE AND ACETAMINOPHEN 5; 325 MG/1; MG/1
TABLET ORAL
Qty: 20 | Refills: 0 | Status: ON HOLD | COMMUNITY
Start: 2019-03-19

## 2025-02-17 RX ORDER — AMOXICILLIN AND CLAVULANATE POTASSIUM 875; 125 MG/1; MG/1
TABLET, FILM COATED ORAL
Qty: 14 | Refills: 0 | Status: ON HOLD | COMMUNITY
Start: 2018-11-01

## 2025-04-14 ENCOUNTER — OFFICE VISIT (OUTPATIENT)
Dept: URBAN - METROPOLITAN AREA SURGERY CENTER 25 | Facility: SURGERY CENTER | Age: 50
End: 2025-04-14
Payer: COMMERCIAL

## 2025-04-14 ENCOUNTER — CLAIMS CREATED FROM THE CLAIM WINDOW (OUTPATIENT)
Dept: URBAN - METROPOLITAN AREA CLINIC 4 | Facility: CLINIC | Age: 50
End: 2025-04-14
Payer: COMMERCIAL

## 2025-04-14 DIAGNOSIS — K50.80 CROHN'S DISEASE OF BOTH SMALL AND LARGE INTESTINE WITHOUT COMPLICATIONS: ICD-10-CM

## 2025-04-14 DIAGNOSIS — K50.80 CROHN'S DISEASE OF BOTH SMALL AND LARGE INTESTINE WITHOUT COMPLICATION: ICD-10-CM

## 2025-04-14 DIAGNOSIS — K63.3 ULCER OF INTESTINE: ICD-10-CM

## 2025-04-14 DIAGNOSIS — Z12.11 ENCOUNTER FOR SCREENING FOR MALIGNANT NEOPLASM OF COLON: ICD-10-CM

## 2025-04-14 DIAGNOSIS — Z98.0 INTESTINAL BYPASS AND ANASTOMOSIS STATUS: ICD-10-CM

## 2025-04-14 DIAGNOSIS — Z98.0 INTESTINAL ANASTOMOSIS PRESENT: ICD-10-CM

## 2025-04-14 PROCEDURE — 88305 TISSUE EXAM BY PATHOLOGIST: CPT | Performed by: PATHOLOGY

## 2025-04-14 PROCEDURE — 45380 COLONOSCOPY AND BIOPSY: CPT | Performed by: INTERNAL MEDICINE

## 2025-04-14 PROCEDURE — 88342 IMHCHEM/IMCYTCHM 1ST ANTB: CPT | Performed by: PATHOLOGY

## 2025-04-14 PROCEDURE — 00812 ANES LWR INTST SCR COLSC: CPT | Performed by: NURSE ANESTHETIST, CERTIFIED REGISTERED

## 2025-04-14 PROCEDURE — 88341 IMHCHEM/IMCYTCHM EA ADD ANTB: CPT | Performed by: PATHOLOGY

## 2025-04-14 RX ORDER — ESTROGENS, CONJUGATED 0.62 MG/1
TAKE 1 TABLET DAILY TABLET, FILM COATED ORAL
Refills: 0 | Status: ON HOLD | COMMUNITY

## 2025-04-14 RX ORDER — USTEKINUMAB 90 MG/ML
90 MG/ML INJECTION, SOLUTION SUBCUTANEOUS
Qty: 3 | Refills: 6 | Status: ACTIVE | COMMUNITY
End: 2026-08-11

## 2025-04-14 RX ORDER — PHENTERMINE HYDROCHLORIDE 30 MG/1
TK 1 T PO  QD TABLET ORAL
Qty: 30 | Refills: 0 | Status: ON HOLD | COMMUNITY
Start: 2019-03-07

## 2025-04-14 RX ORDER — AZITHROMYCIN 500 MG/1
TABLET, FILM COATED ORAL
Qty: 5 | Refills: 0 | Status: ON HOLD | COMMUNITY
Start: 2018-03-19

## 2025-04-14 RX ORDER — AMLODIPINE BESYLATE 2.5 MG/1
TAKE 1 TABLET DAILY TABLET ORAL
Refills: 0 | Status: ACTIVE | COMMUNITY
Start: 2019-08-07

## 2025-04-14 RX ORDER — FOLIC ACID 1 MG/1
TAKE 1 TABLET BY MOUTH DAILY AS DIRECTED TABLET ORAL
Qty: 90 | Refills: 3 | Status: ON HOLD | COMMUNITY
Start: 2019-04-10

## 2025-04-14 RX ORDER — CHOLESTYRAMINE 4 G/5.5G
MIX THE CONTENTS OF 1 POWDER PACKET WITH 2-6 OZ OF NONCARBONATED BEVERAGE AND SWALLOW ONCE DAILY IN THE EVENING POWDER, FOR SUSPENSION ORAL
Qty: 60 | Refills: 5 | Status: ON HOLD | COMMUNITY
Start: 2019-11-07

## 2025-04-14 RX ORDER — CARVEDILOL 6.25 MG/1
1 TABLET WITH FOOD TABLET, FILM COATED ORAL TWICE A DAY
Status: ON HOLD | COMMUNITY

## 2025-04-14 RX ORDER — AMOXICILLIN AND CLAVULANATE POTASSIUM 875; 125 MG/1; MG/1
TABLET, FILM COATED ORAL
Qty: 14 | Refills: 0 | Status: ON HOLD | COMMUNITY
Start: 2018-11-01

## 2025-04-14 RX ORDER — HYDROCODONE BITARTRATE AND ACETAMINOPHEN 5; 325 MG/1; MG/1
TABLET ORAL
Qty: 20 | Refills: 0 | Status: ON HOLD | COMMUNITY
Start: 2019-03-19

## 2025-04-14 RX ORDER — FLUCONAZOLE 150 MG/1
TABLET ORAL
Qty: 1 | Refills: 0 | Status: ON HOLD | COMMUNITY
Start: 2018-03-19

## 2025-04-14 RX ORDER — LORAZEPAM 0.5 MG/1
TAKE 1 TABLET EVERY 4-6 HOURS PRN FOR RESTLESSNESS TABLET ORAL
Qty: 30 | Refills: 0 | Status: ACTIVE | COMMUNITY

## 2025-04-14 RX ORDER — OSELTAMIVIR PHOSPHATE 75 MG/1
CAPSULE ORAL
Qty: 10 | Refills: 0 | Status: ON HOLD | COMMUNITY
Start: 2019-01-31

## 2025-04-14 RX ORDER — BENAZEPRIL HYDROCHLORIDE 10 MG/1
TABLET, COATED ORAL
Qty: 30 | Refills: 0 | Status: ON HOLD | COMMUNITY
Start: 2019-02-19

## 2025-04-14 RX ORDER — METHOTREXATE 25 MG/ML
INJECT 25 MG(1ML) SUBCUTANEOUS ONCE A WEEK INJECTION, SOLUTION INTRA-ARTERIAL; INTRAMUSCULAR; INTRAVENOUS
Qty: 12 | Refills: 3 | Status: ON HOLD | COMMUNITY
Start: 2016-07-01

## 2025-04-14 RX ORDER — ALENDRONATE SODIUM 70 MG/1
TABLET ORAL
Qty: 4 | Refills: 0 | Status: ON HOLD | COMMUNITY
Start: 2018-03-05

## 2025-04-14 RX ORDER — ONDANSETRON HYDROCHLORIDE 4 MG/1
1 TABLET TABLET, FILM COATED ORAL EVERY 4 HOURS
Qty: 30 TABLET | Refills: 1 | Status: ACTIVE | COMMUNITY
Start: 2017-08-09

## 2025-04-14 RX ORDER — ONDANSETRON HYDROCHLORIDE 8 MG/1
TK 1 T PO Q 8 H PRF NAUSEA TABLET, FILM COATED ORAL
Qty: 18 | Refills: 0 | Status: ON HOLD | COMMUNITY
Start: 2018-03-05

## 2025-04-14 RX ORDER — BUPROPION HYDROCHLORIDE 300 MG/1
TK 1 T PO  QAM TABLET, EXTENDED RELEASE ORAL
Qty: 90 | Refills: 0 | Status: ON HOLD | COMMUNITY
Start: 2018-10-01

## 2025-04-14 RX ORDER — BUDESONIDE 28 MG/1
APPLY RECTALLY TWICE DAILY AEROSOL, FOAM RECTAL
Qty: 2 | Refills: 2 | Status: ON HOLD | COMMUNITY
Start: 2019-11-07

## 2025-05-12 ENCOUNTER — LAB OUTSIDE AN ENCOUNTER (OUTPATIENT)
Dept: URBAN - METROPOLITAN AREA CLINIC 113 | Facility: CLINIC | Age: 50
End: 2025-05-12

## 2025-05-12 ENCOUNTER — OFFICE VISIT (OUTPATIENT)
Dept: URBAN - METROPOLITAN AREA CLINIC 113 | Facility: CLINIC | Age: 50
End: 2025-05-12
Payer: COMMERCIAL

## 2025-05-12 ENCOUNTER — TELEPHONE ENCOUNTER (OUTPATIENT)
Dept: URBAN - METROPOLITAN AREA CLINIC 113 | Facility: CLINIC | Age: 50
End: 2025-05-12

## 2025-05-12 DIAGNOSIS — K50.80 CROHN'S DISEASE OF BOTH SMALL AND LARGE INTESTINE WITHOUT COMPLICATION: ICD-10-CM

## 2025-05-12 PROCEDURE — 99213 OFFICE O/P EST LOW 20 MIN: CPT | Performed by: INTERNAL MEDICINE

## 2025-05-12 RX ORDER — AZITHROMYCIN 500 MG/1
TABLET, FILM COATED ORAL
Qty: 5 | Refills: 0 | Status: ON HOLD | COMMUNITY
Start: 2018-03-19

## 2025-05-12 RX ORDER — HYDROCODONE BITARTRATE AND ACETAMINOPHEN 5; 325 MG/1; MG/1
TABLET ORAL
Qty: 20 | Refills: 0 | Status: ON HOLD | COMMUNITY
Start: 2019-03-19

## 2025-05-12 RX ORDER — ALENDRONATE SODIUM 70 MG/1
TABLET ORAL
Qty: 4 | Refills: 0 | Status: ON HOLD | COMMUNITY
Start: 2018-03-05

## 2025-05-12 RX ORDER — ESTROGENS, CONJUGATED 0.62 MG/1
TAKE 1 TABLET DAILY TABLET, FILM COATED ORAL
Refills: 0 | Status: ON HOLD | COMMUNITY

## 2025-05-12 RX ORDER — LORAZEPAM 0.5 MG/1
TAKE 1 TABLET EVERY 4-6 HOURS PRN FOR RESTLESSNESS TABLET ORAL
Qty: 30 | Refills: 0 | Status: ACTIVE | COMMUNITY

## 2025-05-12 RX ORDER — BUPROPION HYDROCHLORIDE 300 MG/1
TK 1 T PO  QAM TABLET, EXTENDED RELEASE ORAL
Qty: 90 | Refills: 0 | Status: ON HOLD | COMMUNITY
Start: 2018-10-01

## 2025-05-12 RX ORDER — AMLODIPINE BESYLATE 2.5 MG/1
TAKE 1 TABLET DAILY TABLET ORAL
Refills: 0 | Status: ACTIVE | COMMUNITY
Start: 2019-08-07

## 2025-05-12 RX ORDER — FLUCONAZOLE 150 MG/1
TABLET ORAL
Qty: 1 | Refills: 0 | Status: ACTIVE | COMMUNITY
Start: 2018-03-19

## 2025-05-12 RX ORDER — PHENTERMINE HYDROCHLORIDE 30 MG/1
TK 1 T PO  QD TABLET ORAL
Qty: 30 | Refills: 0 | Status: ON HOLD | COMMUNITY
Start: 2019-03-07

## 2025-05-12 RX ORDER — BUDESONIDE 28 MG/1
APPLY RECTALLY TWICE DAILY AEROSOL, FOAM RECTAL
Qty: 2 | Refills: 2 | Status: ON HOLD | COMMUNITY
Start: 2019-11-07

## 2025-05-12 RX ORDER — CHOLESTYRAMINE 4 G/5.5G
MIX THE CONTENTS OF 1 POWDER PACKET WITH 2-6 OZ OF NONCARBONATED BEVERAGE AND SWALLOW ONCE DAILY IN THE EVENING POWDER, FOR SUSPENSION ORAL
Qty: 60 | Refills: 5 | Status: ON HOLD | COMMUNITY
Start: 2019-11-07

## 2025-05-12 RX ORDER — METHOTREXATE 25 MG/ML
INJECT 25 MG(1ML) SUBCUTANEOUS ONCE A WEEK INJECTION, SOLUTION INTRA-ARTERIAL; INTRAMUSCULAR; INTRAVENOUS
Qty: 12 | Refills: 3 | Status: ON HOLD | COMMUNITY
Start: 2016-07-01

## 2025-05-12 RX ORDER — BENAZEPRIL HYDROCHLORIDE 10 MG/1
TABLET, COATED ORAL
Qty: 30 | Refills: 0 | Status: ON HOLD | COMMUNITY
Start: 2019-02-19

## 2025-05-12 RX ORDER — OSELTAMIVIR PHOSPHATE 75 MG/1
CAPSULE ORAL
Qty: 10 | Refills: 0 | Status: ON HOLD | COMMUNITY
Start: 2019-01-31

## 2025-05-12 RX ORDER — FOLIC ACID 1 MG/1
TAKE 1 TABLET BY MOUTH DAILY AS DIRECTED TABLET ORAL
Qty: 90 | Refills: 3 | Status: ON HOLD | COMMUNITY
Start: 2019-04-10

## 2025-05-12 RX ORDER — USTEKINUMAB 90 MG/ML
90 MG/ML INJECTION, SOLUTION SUBCUTANEOUS
Qty: 3 | Refills: 6 | Status: ACTIVE | COMMUNITY
End: 2026-08-11

## 2025-05-12 RX ORDER — ONDANSETRON HYDROCHLORIDE 4 MG/1
1 TABLET TABLET, FILM COATED ORAL EVERY 4 HOURS
Qty: 30 TABLET | Refills: 1 | Status: ACTIVE | COMMUNITY
Start: 2017-08-09

## 2025-05-12 RX ORDER — ONDANSETRON HYDROCHLORIDE 8 MG/1
TK 1 T PO Q 8 H PRF NAUSEA TABLET, FILM COATED ORAL
Qty: 18 | Refills: 0 | Status: ON HOLD | COMMUNITY
Start: 2018-03-05

## 2025-05-12 RX ORDER — CARVEDILOL 6.25 MG/1
1 TABLET WITH FOOD TABLET, FILM COATED ORAL TWICE A DAY
Status: ON HOLD | COMMUNITY

## 2025-05-12 RX ORDER — AMOXICILLIN AND CLAVULANATE POTASSIUM 875; 125 MG/1; MG/1
TABLET, FILM COATED ORAL
Qty: 14 | Refills: 0 | Status: ON HOLD | COMMUNITY
Start: 2018-11-01

## 2025-05-12 NOTE — HPI-TODAY'S VISIT:
The patient is a very pleasant 47-year-old female who returns after long interval absence.  Initially started seeing the patient in 2015 for Crohn's disease as a second opinion.  She was comanaged with [facility] due to the complicated nature of her Crohn's disease.  Last colonoscopy by me was in December 2018 for disease activity assessment.  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  There was moderate inflammation of the distal ileum consistent with her Crohn's disease.  We last saw the patient 2-1/2 years ago. Laboratory testing from September revealed a negative QuantiFERON gold negative hepatitis B. The patient states that she is currently on Stelara.  She stated that Remicade stopped working.  She was switched to Entyvio but this caused profound side effects so she could not take it.  She then was placed on Humira but developed an ectopic pregnancy and other issues and complications related to Humira and was eventually switched to Stelara and she states she has had an excellent response with this.  She does take Questran as needed for diarrhea which she states works quite well.  She states she is felt the best she has felt in years on the Stelara.  She was on methotrexate but when COVID started she discontinued the methotrexate. Interval history, 11/18/2024. The patient states she has had no new past medical or past surgical history since I last saw her.  She states she is doing quite well.  She states Questran well was excellent on the loose stools caused too much bloating.  She is now taking a chlorophyll supplement which she states does a great job of keeping her bowel movements down to 2-3 bowel movements daily.  She would also like a refill of her Ativan and Zofran.  She rarely utilizes these. Interval history, 5/12/2025. Colonoscopy performed April 14 of last month for disease activity assessment and colon cancer screening due to Crohn's colitis greater than 8 years. Colonoscopy revealed moderate stenosis at the anastomosis which was in the mid transverse colon.  Almost allowing passage of the scope.  There was mild edema erythema and erosions at the anastomosis.  Biopsies of the anastomosis were more consistent with anastomotic changes than Crohn's disease activity. The patient states she is doing very well at this time.  We reviewed her colonoscopy findings.